# Patient Record
Sex: MALE | Race: WHITE | URBAN - METROPOLITAN AREA
[De-identification: names, ages, dates, MRNs, and addresses within clinical notes are randomized per-mention and may not be internally consistent; named-entity substitution may affect disease eponyms.]

---

## 2017-04-21 ENCOUNTER — APPOINTMENT (OUTPATIENT)
Dept: GENERAL RADIOLOGY | Facility: CLINIC | Age: 57
End: 2017-04-21
Attending: EMERGENCY MEDICINE
Payer: COMMERCIAL

## 2017-04-21 ENCOUNTER — HOSPITAL ENCOUNTER (OUTPATIENT)
Facility: CLINIC | Age: 57
Setting detail: OBSERVATION
Discharge: HOME | End: 2017-04-22
Attending: EMERGENCY MEDICINE | Admitting: INTERNAL MEDICINE
Payer: COMMERCIAL

## 2017-04-21 DIAGNOSIS — J18.9 PNEUMONIA: ICD-10-CM

## 2017-04-21 DIAGNOSIS — J18.9 COMMUNITY ACQUIRED PNEUMONIA: Primary | ICD-10-CM

## 2017-04-21 LAB
ALBUMIN SERPL-MCNC: 4.1 G/DL (ref 3.4–5)
ALBUMIN UR-MCNC: NEGATIVE MG/DL
ALP SERPL-CCNC: 36 U/L (ref 40–150)
ALT SERPL W P-5'-P-CCNC: 35 U/L (ref 0–70)
ANION GAP SERPL CALCULATED.3IONS-SCNC: 11 MMOL/L (ref 3–14)
APPEARANCE UR: CLEAR
AST SERPL W P-5'-P-CCNC: 25 U/L (ref 0–45)
BASOPHILS # BLD AUTO: 0 10E9/L (ref 0–0.2)
BASOPHILS NFR BLD AUTO: 0.2 %
BILIRUB SERPL-MCNC: 0.8 MG/DL (ref 0.2–1.3)
BILIRUB UR QL STRIP: NEGATIVE
BUN SERPL-MCNC: 23 MG/DL (ref 7–30)
CALCIUM SERPL-MCNC: 9.1 MG/DL (ref 8.5–10.1)
CHLORIDE SERPL-SCNC: 101 MMOL/L (ref 94–109)
CO2 BLDCOV-SCNC: 27 MMOL/L (ref 21–28)
CO2 SERPL-SCNC: 25 MMOL/L (ref 20–32)
COLOR UR AUTO: YELLOW
CREAT SERPL-MCNC: 1.03 MG/DL (ref 0.66–1.25)
DIFFERENTIAL METHOD BLD: ABNORMAL
EOSINOPHIL # BLD AUTO: 0 10E9/L (ref 0–0.7)
EOSINOPHIL NFR BLD AUTO: 0.1 %
ERYTHROCYTE [DISTWIDTH] IN BLOOD BY AUTOMATED COUNT: 14.2 % (ref 10–15)
FLUAV+FLUBV AG SPEC QL: NEGATIVE
FLUAV+FLUBV AG SPEC QL: NORMAL
GFR SERPL CREATININE-BSD FRML MDRD: 75 ML/MIN/1.7M2
GLUCOSE SERPL-MCNC: 100 MG/DL (ref 70–99)
GLUCOSE UR STRIP-MCNC: NEGATIVE MG/DL
HCT VFR BLD AUTO: 45 % (ref 40–53)
HGB BLD-MCNC: 16.3 G/DL (ref 13.3–17.7)
HGB UR QL STRIP: NEGATIVE
HYALINE CASTS #/AREA URNS LPF: 1 /LPF (ref 0–2)
IMM GRANULOCYTES # BLD: 0.1 10E9/L (ref 0–0.4)
IMM GRANULOCYTES NFR BLD: 0.5 %
INTERPRETATION ECG - MUSE: NORMAL
KETONES UR STRIP-MCNC: 10 MG/DL
LACTATE BLD-SCNC: 1.5 MMOL/L (ref 0.7–2.1)
LEUKOCYTE ESTERASE UR QL STRIP: NEGATIVE
LYMPHOCYTES # BLD AUTO: 0.5 10E9/L (ref 0.8–5.3)
LYMPHOCYTES NFR BLD AUTO: 2.8 %
MCH RBC QN AUTO: 32.9 PG (ref 26.5–33)
MCHC RBC AUTO-ENTMCNC: 36.2 G/DL (ref 31.5–36.5)
MCV RBC AUTO: 91 FL (ref 78–100)
MONOCYTES # BLD AUTO: 1 10E9/L (ref 0–1.3)
MONOCYTES NFR BLD AUTO: 5.2 %
MUCOUS THREADS #/AREA URNS LPF: PRESENT /LPF
NEUTROPHILS # BLD AUTO: 17.9 10E9/L (ref 1.6–8.3)
NEUTROPHILS NFR BLD AUTO: 91.2 %
NITRATE UR QL: NEGATIVE
NRBC # BLD AUTO: 0 10*3/UL
NRBC BLD AUTO-RTO: 0 /100
PCO2 BLDV: 41 MM HG (ref 40–50)
PH BLDV: 7.43 PH (ref 7.32–7.43)
PH UR STRIP: 8 PH (ref 5–7)
PLATELET # BLD AUTO: 166 10E9/L (ref 150–450)
PO2 BLDV: 34 MM HG (ref 25–47)
POTASSIUM SERPL-SCNC: 4 MMOL/L (ref 3.4–5.3)
PROT SERPL-MCNC: 7 G/DL (ref 6.8–8.8)
RBC # BLD AUTO: 4.96 10E12/L (ref 4.4–5.9)
RBC #/AREA URNS AUTO: <1 /HPF (ref 0–2)
SAO2 % BLDV FROM PO2: 67 %
SODIUM SERPL-SCNC: 137 MMOL/L (ref 133–144)
SP GR UR STRIP: 1.01 (ref 1–1.03)
SPECIMEN SOURCE: NORMAL
TROPONIN I SERPL-MCNC: NORMAL UG/L (ref 0–0.04)
URN SPEC COLLECT METH UR: ABNORMAL
UROBILINOGEN UR STRIP-MCNC: NORMAL MG/DL (ref 0–2)
WBC # BLD AUTO: 19.6 10E9/L (ref 4–11)
WBC #/AREA URNS AUTO: 1 /HPF (ref 0–2)

## 2017-04-21 PROCEDURE — 87040 BLOOD CULTURE FOR BACTERIA: CPT | Performed by: EMERGENCY MEDICINE

## 2017-04-21 PROCEDURE — 36415 COLL VENOUS BLD VENIPUNCTURE: CPT

## 2017-04-21 PROCEDURE — 96375 TX/PRO/DX INJ NEW DRUG ADDON: CPT

## 2017-04-21 PROCEDURE — 87077 CULTURE AEROBIC IDENTIFY: CPT | Performed by: EMERGENCY MEDICINE

## 2017-04-21 PROCEDURE — 71020 XR CHEST 2 VW: CPT

## 2017-04-21 PROCEDURE — 87800 DETECT AGNT MULT DNA DIREC: CPT | Performed by: EMERGENCY MEDICINE

## 2017-04-21 PROCEDURE — 25000128 H RX IP 250 OP 636: Performed by: INTERNAL MEDICINE

## 2017-04-21 PROCEDURE — 93005 ELECTROCARDIOGRAM TRACING: CPT

## 2017-04-21 PROCEDURE — 36415 COLL VENOUS BLD VENIPUNCTURE: CPT | Performed by: EMERGENCY MEDICINE

## 2017-04-21 PROCEDURE — 87186 SC STD MICRODIL/AGAR DIL: CPT | Performed by: EMERGENCY MEDICINE

## 2017-04-21 PROCEDURE — 85025 COMPLETE CBC W/AUTO DIFF WBC: CPT | Performed by: EMERGENCY MEDICINE

## 2017-04-21 PROCEDURE — 99285 EMERGENCY DEPT VISIT HI MDM: CPT | Mod: 25

## 2017-04-21 PROCEDURE — 81001 URINALYSIS AUTO W/SCOPE: CPT | Performed by: EMERGENCY MEDICINE

## 2017-04-21 PROCEDURE — 87804 INFLUENZA ASSAY W/OPTIC: CPT | Performed by: EMERGENCY MEDICINE

## 2017-04-21 PROCEDURE — 25000128 H RX IP 250 OP 636: Performed by: EMERGENCY MEDICINE

## 2017-04-21 PROCEDURE — 82803 BLOOD GASES ANY COMBINATION: CPT

## 2017-04-21 PROCEDURE — 96365 THER/PROPH/DIAG IV INF INIT: CPT

## 2017-04-21 PROCEDURE — 84484 ASSAY OF TROPONIN QUANT: CPT | Performed by: EMERGENCY MEDICINE

## 2017-04-21 PROCEDURE — G0378 HOSPITAL OBSERVATION PER HR: HCPCS

## 2017-04-21 PROCEDURE — 83605 ASSAY OF LACTIC ACID: CPT

## 2017-04-21 PROCEDURE — 96361 HYDRATE IV INFUSION ADD-ON: CPT

## 2017-04-21 PROCEDURE — 96367 TX/PROPH/DG ADDL SEQ IV INF: CPT

## 2017-04-21 PROCEDURE — 99220 ZZC INITIAL OBSERVATION CARE,LEVL III: CPT | Performed by: INTERNAL MEDICINE

## 2017-04-21 PROCEDURE — 80053 COMPREHEN METABOLIC PANEL: CPT | Performed by: EMERGENCY MEDICINE

## 2017-04-21 RX ORDER — AMOXICILLIN 250 MG
1-2 CAPSULE ORAL 2 TIMES DAILY PRN
Status: DISCONTINUED | OUTPATIENT
Start: 2017-04-21 | End: 2017-04-22 | Stop reason: HOSPADM

## 2017-04-21 RX ORDER — ONDANSETRON 2 MG/ML
4 INJECTION INTRAMUSCULAR; INTRAVENOUS EVERY 6 HOURS PRN
Status: DISCONTINUED | OUTPATIENT
Start: 2017-04-21 | End: 2017-04-22 | Stop reason: HOSPADM

## 2017-04-21 RX ORDER — SODIUM CHLORIDE 9 MG/ML
INJECTION, SOLUTION INTRAVENOUS CONTINUOUS
Status: DISCONTINUED | OUTPATIENT
Start: 2017-04-21 | End: 2017-04-22 | Stop reason: HOSPADM

## 2017-04-21 RX ORDER — ONDANSETRON 2 MG/ML
4 INJECTION INTRAMUSCULAR; INTRAVENOUS ONCE
Status: COMPLETED | OUTPATIENT
Start: 2017-04-21 | End: 2017-04-21

## 2017-04-21 RX ORDER — CEFTRIAXONE 1 G/1
1 INJECTION, POWDER, FOR SOLUTION INTRAMUSCULAR; INTRAVENOUS EVERY 24 HOURS
Status: DISCONTINUED | OUTPATIENT
Start: 2017-04-22 | End: 2017-04-22 | Stop reason: HOSPADM

## 2017-04-21 RX ORDER — ONDANSETRON 4 MG/1
4 TABLET, ORALLY DISINTEGRATING ORAL EVERY 6 HOURS PRN
Status: DISCONTINUED | OUTPATIENT
Start: 2017-04-21 | End: 2017-04-22 | Stop reason: HOSPADM

## 2017-04-21 RX ORDER — SODIUM CHLORIDE 9 MG/ML
1000 INJECTION, SOLUTION INTRAVENOUS CONTINUOUS
Status: DISCONTINUED | OUTPATIENT
Start: 2017-04-21 | End: 2017-04-22 | Stop reason: HOSPADM

## 2017-04-21 RX ORDER — CEFTRIAXONE 2 G/1
2 INJECTION, POWDER, FOR SOLUTION INTRAMUSCULAR; INTRAVENOUS ONCE
Status: COMPLETED | OUTPATIENT
Start: 2017-04-21 | End: 2017-04-21

## 2017-04-21 RX ORDER — NALOXONE HYDROCHLORIDE 0.4 MG/ML
.1-.4 INJECTION, SOLUTION INTRAMUSCULAR; INTRAVENOUS; SUBCUTANEOUS
Status: DISCONTINUED | OUTPATIENT
Start: 2017-04-21 | End: 2017-04-22 | Stop reason: HOSPADM

## 2017-04-21 RX ORDER — LISINOPRIL AND HYDROCHLOROTHIAZIDE 20; 25 MG/1; MG/1
1 TABLET ORAL DAILY
COMMUNITY

## 2017-04-21 RX ORDER — ACETAMINOPHEN 325 MG/1
650 TABLET ORAL EVERY 4 HOURS PRN
Status: DISCONTINUED | OUTPATIENT
Start: 2017-04-21 | End: 2017-04-22 | Stop reason: HOSPADM

## 2017-04-21 RX ORDER — BISACODYL 10 MG
10 SUPPOSITORY, RECTAL RECTAL DAILY PRN
Status: DISCONTINUED | OUTPATIENT
Start: 2017-04-21 | End: 2017-04-22 | Stop reason: HOSPADM

## 2017-04-21 RX ORDER — AZITHROMYCIN 250 MG/1
250 TABLET, FILM COATED ORAL DAILY
Status: DISCONTINUED | OUTPATIENT
Start: 2017-04-22 | End: 2017-04-22 | Stop reason: HOSPADM

## 2017-04-21 RX ORDER — ZOLPIDEM TARTRATE 5 MG/1
5 TABLET ORAL
Status: DISCONTINUED | OUTPATIENT
Start: 2017-04-21 | End: 2017-04-22 | Stop reason: HOSPADM

## 2017-04-21 RX ADMIN — AZITHROMYCIN MONOHYDRATE 500 MG: 500 INJECTION, POWDER, LYOPHILIZED, FOR SOLUTION INTRAVENOUS at 17:23

## 2017-04-21 RX ADMIN — CEFTRIAXONE 2 G: 2 INJECTION, POWDER, FOR SOLUTION INTRAMUSCULAR; INTRAVENOUS at 16:45

## 2017-04-21 RX ADMIN — ONDANSETRON 4 MG: 2 SOLUTION INTRAMUSCULAR; INTRAVENOUS at 15:36

## 2017-04-21 RX ADMIN — SODIUM CHLORIDE: 9 INJECTION, SOLUTION INTRAVENOUS at 19:26

## 2017-04-21 RX ADMIN — SODIUM CHLORIDE 1000 ML: 9 INJECTION, SOLUTION INTRAVENOUS at 16:42

## 2017-04-21 RX ADMIN — SODIUM CHLORIDE 1000 ML: 9 INJECTION, SOLUTION INTRAVENOUS at 15:36

## 2017-04-21 ASSESSMENT — ENCOUNTER SYMPTOMS
HEMATURIA: 0
CHILLS: 1
FATIGUE: 1
SHORTNESS OF BREATH: 1
DYSURIA: 0
VOMITING: 0
COUGH: 0
RHINORRHEA: 0
DIFFICULTY URINATING: 0
FREQUENCY: 0
CHEST TIGHTNESS: 1
DIZZINESS: 1
DIARRHEA: 0
HEADACHES: 0
SINUS PRESSURE: 0

## 2017-04-21 NOTE — IP AVS SNAPSHOT
MRN:[id number]                      After Visit Summary   4/21/2017    Aidan Perrin    MRN: [id number]           Thank you!     Thank you for choosing [facility] for your care. Our goal is always to provide you with excellent care. Hearing back from our patients is one way we can continue to improve our services. Please take a few minutes to complete the written survey that you may receive in the mail after you visit with us. Thank you!        Patient Information     Date Of Birth          1960        About your hospital stay     You were admitted on:  April 21, 2017 [facility]    You were discharged on:  April 22, 2017        Reason for your hospital stay       You were admitted for evaluation of community acquired pneumonia. You were started on 2 antibiotics that will be continued as outpatient. Follow up with with PCP in 10-14 days. Seek urgent re-evaluation if you develop persistent fever or shortness of breath.                  Who to Call     For medical emergencies, please call 911.  For non-urgent questions about your medical care, please call your primary care provider or clinic, [phone]          Attending Provider     Provider Specialty    Elisha Vargas MD Emergency Medicine    Kiki, Sumeet Love MD Internal Medicine       Primary Care Provider Office Phone # Fax #    Sumeet Landaverde [phone]       [facility] [address] [place] [address]        After Care Instructions     Activity       Your activity upon discharge: activity as tolerated            Diet       Follow this diet upon discharge: Orders Placed This Encounter      Regular Diet Adult                  Follow-up Appointments     Follow-up and recommended labs and tests        Follow up with primary care provider, Sumeet Landaverde, within 10-14 days for hospital follow- up.  The following labs/tests are recommended:  "Repeat CXR.                  Pending Results     Date and Time Order Name Status Description    2017 1612 Blood culture Preliminary     2017 1549 Blood culture Preliminary             Statement of Approval     Ordered          17 1003  I have reviewed and agree with all the recommendations and orders detailed in this document.  EFFECTIVE NOW     Approved and electronically signed by:  Shiloh Riggins PA-C             Admission Information     Date & Time Provider Department Dept. Phone    2017 Sumeet Swanson MD [facility] [phone]      Your Vitals Were     Blood Pressure Pulse Temperature Respirations Height Weight    128/75 (BP Location: Right arm) 131 98.6  F (37  C) (Oral) 16 1.88 m (6' 2\") 97.5 kg (215 lb)    Pulse Oximetry BMI (Body Mass Index)                99% 27.6 kg/m2          MyChart Information     [facility] lets you send messages to your doctor, view your test results, renew your prescriptions, schedule appointments and more. To sign up, go to [email or web address] . Click on \"Log in\" on the left side of the screen, which will take you to the Welcome page. Then click on \"Sign up Now\" on the right side of the page.     You will be asked to enter the access code listed below, as well as some personal information. Please follow the directions to create your username and password.     Your access code is: [email or web address]  Expires: 2017  4:32 PM     Your access code will  in 90 days. If you need help or a new code, please call your [facility] clinic or [phone].        Care EveryWhere ID     This is your Care EveryWhere ID. This could be used by other organizations to access your [facility] medical records  [id number]           Review of your medicines      START taking        Dose / Directions    azithromycin 250 MG tablet   Commonly known as:  ZITHROMAX   Indication:  Community Acquired Pneumonia        Dose:  250 mg   Start taking on:  " 4/23/2017   Take 1 tablet (250 mg) by mouth daily   Quantity:  3 tablet   Refills:  0       cefuroxime 500 MG tablet   Commonly known as:  CEFTIN        Dose:  500 mg   Take 1 tablet (500 mg) by mouth 2 times daily for 7 days   Quantity:  14 tablet   Refills:  0         CONTINUE these medicines which have NOT CHANGED        Dose / Directions    CENTRUM ADULTS PO        Dose:  1 tablet   Take 1 tablet by mouth daily   Refills:  0       lisinopril-hydrochlorothiazide 20-25 MG per tablet   Commonly known as:  PRINZIDE/ZESTORETIC        Dose:  1 tablet   Take 1 tablet by mouth daily   Refills:  0       METOPROLOL SUCCINATE ER PO        Dose:  50 mg   Take 50 mg by mouth every evening   Refills:  0            Where to get your medicines      These medications were sent to [facility] - [address]  [address], [place] [address]     Phone:  [phone]     azithromycin 250 MG tablet    cefuroxime 500 MG tablet                Protect others around you: Learn how to safely use, store and throw away your medicines at www.disposemymeds.org.             Medication List: This is a list of all your medications and when to take them. Check marks below indicate your daily home schedule. Keep this list as a reference.      Medications           Morning Afternoon Evening Bedtime As Needed    azithromycin 250 MG tablet   Commonly known as:  ZITHROMAX   Take 1 tablet (250 mg) by mouth daily   Start taking on:  4/23/2017   Last time this was given:  250 mg on 4/22/2017  8:07 AM                                cefuroxime 500 MG tablet   Commonly known as:  CEFTIN   Take 1 tablet (500 mg) by mouth 2 times daily for 7 days                                CENTRUM ADULTS PO   Take 1 tablet by mouth daily                                lisinopril-hydrochlorothiazide 20-25 MG per tablet   Commonly known as:  PRINZIDE/ZESTORETIC   Take 1 tablet by mouth daily                                 METOPROLOL SUCCINATE ER PO   Take 50 mg by mouth every evening

## 2017-04-21 NOTE — ED NOTES
RN attempted to administer IV Rochepin but second set of blood cultures has not been collected by lab. RN called lab approx 30 mins ago. Rn called lab again and lab personal is on the way

## 2017-04-21 NOTE — IP AVS SNAPSHOT
[facility]    [address] [place] [address]    Phone:  [phone]                                       After Visit Summary   4/21/2017    Adian Perrin    MRN: [id number]           After Visit Summary Signature Page     I have received my discharge instructions, and my questions have been answered. I have discussed any challenges I see with this plan with the nurse or doctor.    ..........................................................................................................................................  Patient/Patient Representative Signature      ..........................................................................................................................................  Patient Representative Print Name and Relationship to Patient    ..................................................               ................................................  Date                                            Time    ..........................................................................................................................................  Reviewed by Signature/Title    ...................................................              ..............................................  Date                                                            Time

## 2017-04-21 NOTE — ED NOTES
"[facility]  ED Nurse Handoff Report    ED Chief complaint: Chills (at 0900 pt had chills developed SOB with dizziness called PMD nurse told to come to ER)      ED Diagnosis:   Final diagnoses:   None       Code Status: Full Code    Allergies:   Allergies   Allergen Reactions     Penicillins        Activity level - Baseline/Home:  Independent    Activity Level - Current:   Independent     Needed?: No    Isolation: No  Infection: Not Applicable    Bariatric?: No    Vital Signs:   Vitals:    04/21/17 1632 04/21/17 1645 04/21/17 1658 04/21/17 1700   BP:    116/83   Pulse:       Resp: 15  12    Temp:       TempSrc:       SpO2: 100% 99% 99% 100%   Weight:       Height:           Cardiac Rhythm: ,        Pain level: 0-10 Pain Scale: 1    Is this patient confused?: No    Patient Report: Initial Complaint: Chills, dizziness, SOB  Focused Assessment: Pt presents from home when this morning at 0900 he developed chills, SOB, dizziness and \"chest compression.\" Pt states his chest feels tight in the epigastric area and pain is worsened by taking a deep breath. Pt states he was febrile at home and took ibuprofen to treat his fever. Pt denied any other sx besides his chills/sob/chest discomfort. Pt WBC 19, lactic normal and so blood cultures x2 were sent. Blood work otherwise unremarkable. UA clean and pt went for CXR which showed a L PNA. Pt given 2 liters IVF and IV rochephin. Rn to hang azithromycin at this time. Pt initially ST but now HR ranging from  bpm, blood pressure 105-115 systolic and 100% RA. Wife at bedside   Tests Performed: CXR, troponin (negative), blood work, lactic (1.5), UA  Abnormal Results: WBC 19, cxr showed L lung PNA  Treatments provided:  2 liters NS, IV antibiotics    Family Comments: Wife at bedside    OBS brochure/video discussed/provided to patient: Yes    ED Medications:   Medications   0.9% sodium chloride BOLUS (0 mLs Intravenous Stopped 4/21/17 1642)     Followed " by   0.9% sodium chloride BOLUS (1,000 mLs Intravenous New Bag 4/21/17 1642)     Followed by   0.9% sodium chloride infusion (not administered)   cefTRIAXone (ROCEPHIN) 2 g vial to attach to  ml bag for ADULTS or NS 50 ml bag for PEDS (2 g Intravenous New Bag 4/21/17 1645)   azithromycin (ZITHROMAX) 500 mg in NaCl 0.9 % 250 mL intermittent infusion (not administered)   ondansetron (ZOFRAN) injection 4 mg (4 mg Intravenous Given 4/21/17 1536)       Drips infusing?:  Yes        ED NURSE PHONE NUMBER: *[id number]

## 2017-04-21 NOTE — ED PROVIDER NOTES
"  History     Chief Complaint:  Chills    HPI   Aidan Perrin is a 56 year old male who presents to the emergency department today for evaluation of chills. The patient reports that he felt fine last night but then this morning at 0900 he developed severe  chills, shortness of breath, dizziness, an elevated temperature, and a \"weird feeling in his arms.\" The patient reports that he then felt sleepy, fatigued, and he just wanted to lie down. This did not relieve his symptoms so he called his primary care physician who told the patient to come here to the emergency department. The patient denies any headache, nose pain, ear pain, coughing, vomiting, diarrhea, or urinary symptoms. Currently, the patient explains that he feels a chest pressure and this is making it difficult for him to breath. He reports that he has only eaten a few bites of cold pizza today. The patient took Ibuprofen this morning at home.     Cardiac/PE/DVT Risk Factors:  History of hypertension - Positive  History of hyperlipidemia - Negative   History of diabetes - Negative   History of smoking - Negative   Personal history of PE/DVT - Negative   Family history of PE/DVT - Negative   Recent travel - Negative   Recent surgery - Negative   Other immobilizations - Negative   Cancer - Negative     Allergies:  Penicillins     Medications:    Metoprolol  Lisinopril     Past Medical History:    Hypertension     Past Surgical History:    History reviewed. No pertinent surgical history.    Family History:    No family history on file.    Social History:  The patient was accompanied to the ED by his wife.  Smoking Status: Never Smoker  Alcohol Use: Positive  Marital Status:   [2]  Employment Status: Works at a [employer] company      Review of Systems   Constitutional: Positive for chills and fatigue.   HENT: Negative for congestion, ear pain, nosebleeds, rhinorrhea and sinus pressure.    Respiratory: Positive for chest tightness (\"Pressure\") and " "shortness of breath. Negative for cough.    Gastrointestinal: Negative for diarrhea and vomiting.   Genitourinary: Negative for decreased urine volume, difficulty urinating, dysuria, frequency, hematuria and urgency.   Neurological: Positive for dizziness. Negative for headaches.   All other systems reviewed and are negative.    Physical Exam   Vitals:  Patient Vitals for the past 24 hrs:   BP Temp Temp src Pulse Heart Rate Resp SpO2 Height Weight   04/21/17 1819 126/79 97.5  F (36.4  C) Oral - 107 16 98 % - -   04/21/17 1800 106/70 - - - 97 - 100 % - -   04/21/17 1745 - - - - 95 - 100 % - -   04/21/17 1730 109/76 - - - 100 - 99 % - -   04/21/17 1700 116/83 - - - 93 - 100 % - -   04/21/17 1658 - - - - - 12 99 % - -   04/21/17 1645 - - - - - - 99 % - -   04/21/17 1632 - - - - 99 15 100 % - -   04/21/17 1630 104/69 - - - 97 - 100 % - -   04/21/17 1615 - - - - 105 12 100 % - -   04/21/17 1610 101/69 - - - 102 13 - - -   04/21/17 1538 - - - - 101 - 100 % - -   04/21/17 1535 116/81 - - - 105 - - - -   04/21/17 1515 111/80 - - - - - - - -   04/21/17 1500 - - - - - - 100 % - -   04/21/17 1449 122/84 97.8  F (36.6  C) Oral 131 - 20 100 % 1.88 m (6' 2\") 97.5 kg (215 lb)   04/21/17 1445 122/84 - - - - - - - -      Physical Exam  Constitutional:  Oriented to person, place, and time.      Appears well-developed and well-nourished. Appears non toxic. Feels warm.   HENT:   Head:    Normocephalic and atraumatic.   Right Ear:   Tympanic membrane and external ear normal.   Left Ear:   Tympanic membrane and external ear normal.   Mouth/Throat:   Oropharynx is clear and moist.      Mucous membranes are normal.   Eyes:    Conjunctivae normal and EOM are normal.      Pupils are equal, round, and reactive to light.   Neck:    Normal range of motion. Neck supple.   Cardiovascular:  Tachycardic rate, regular rhythm, S1 normal and S2 normal.      No gallop and no friction rub. No murmur heard.  Pulmonary/Chest:  Breath sounds normal. No " respiratory distress.      No wheezes. No rhonchi. No rales.   Abdominal:   Soft. No hepatosplenomegaly. No tenderness.      No rebound and no CVA tenderness.   Musculoskeletal:  Normal range of motion.   Neurological:   Alert and oriented to person, place, and time. Normal strength.      GCS eye subscore is 4. GCS verbal subscore is 5.      GCS motor subscore is 6.   Skin:    Skin is warm and dry.   Psychiatric:   Normal mood and affect.      Speech is normal and behavior is normal.      Judgment and thought content normal.      Cognition and memory are normal.     Emergency Department Course     ECG:  ECG taken at 1511, ECG read at 1520  Sinus tachycardia   Inferior infarct, age undetermined  Abnormal ECG  Rate 124 bpm. VA interval 152 ms. QRS duration 72 ms. QT/QTc 300/431 ms. P-R-T axes 58 -15 42.    Imaging:  Radiology findings were communicated with the patient who voiced understanding of the findings.    XR Chest 2 Views  Left basilar atelectasis versus pneumonia.  LORENA CARRERO MD  Reading per radiology    Laboratory:  Laboratory findings were communicated with the patient who voiced understanding of the findings.    Blood Culture: Pending   UA: Ketones: 10 (A), pH: 8.0 (H), Mucous: Present (A)  ISTAT gases lactate ana POCT: pH: 7.43, PCO2: 41, PO2: 34, Bicarbonate: 27, O2 Sat: 67, Lactic Acid: 1.5   CBC: WBC 19.6 (H), HGB 16.3,   CMP: Glucose 100 (H), Alkaline Phosphatase: 36 (L) o/w WNL (Creatinine 1.03)  Troponin I (Collected 1529): <0.015  Blood Culture: Pending       Interventions:  1536 NS 1000 ml IV   1536 Zofran 4 mg IV  1642 NS 1000 ml IV   1645 Rocephin 2 gm IV  1723 Azithromycin 500 mg IV     Emergency Department Course:  Nursing notes and vitals reviewed.  I performed an exam of the patient as documented above.   IV was inserted and blood was drawn for laboratory testing, results above.  The patient provided a urine sample here in the emergency department. This was sent for laboratory  testing, findings above.  The patient was sent for a XR Chest 2 Views while in the emergency department, results above.   1722 I spoke with Dr. Swanson of the hospitalist service who will admit the patient.  I discussed the treatment plan with the patient. They expressed understanding of this plan and consented to admission. I discussed the patient with Dr. Swanson, who will admit the patient to a monitored bed for further evaluation and treatment.  I personally reviewed the laboratory and imaging results with the patient and answered all related questions prior to admission.  Impression & Plan      Medical Decision Making:  Aidan Perrin is a 56 year old male with a history of hypertension who presents feeling acutely ill today with rigors, shortness of breath, dizziness, and chest pressure. He denies any localizing systemic symptoms. He has a normal examination. Here his EKG shows tachycardia and he has an elevated white count of 19.6 and a chest x-ray that shows left basilar atelectasis versus pneumonia. His blood pressures have been stable. Here he has appeared non toxic. He has received normal saline bolus, Rocephin 2 gm IV, and Azithromycin IV. He is still not feeling quite right and will be kept overnight for observation on telemetry.     I suspect that the source of his infection is the pneumonia. He does have the chest pressure but I believe that this is more related to infection than primary cardiac, but certainly he can be observed for that assessment.    Assessment:   1. Sepsis secondary to pneumonia  2. Chest pressure, suspect secondary to 1.     Diagnosis:    ICD-10-CM    1. Pneumonia J18.9 Blood culture     Blood culture     Influenza A/B antigen     Disposition:   The patient is admitted into the care of Dr. Swanson to an observation telemetry bed.     Scribe Disclosure:  JHONATAN, Jordan Lares, am serving as a scribe at 3:02 PM on 4/21/2017 to document services personally performed by Elisha Vargas MD,  based on my observations and the provider's statements to me.     EMERGENCY DEPARTMENT       Elisha Vargas MD  04/21/17 9427

## 2017-04-21 NOTE — PHARMACY-ADMISSION MEDICATION HISTORY
Admission medication history interview status for the 4/21/2017  admission is complete. See EPIC admission navigator for prior to admission medications     Medication history source reliability:Good    Actions taken by pharmacist (provider contacted, etc):Verified Rx meds with patient's bottles that he brought to the hospital     Additional medication history information not noted on PTA med list :None    Medication reconciliation/reorder completed by provider prior to medication history? No    Time spent in this activity: 10 minutes    Prior to Admission medications    Medication Sig Last Dose Taking? Auth Provider   METOPROLOL SUCCINATE ER PO Take 50 mg by mouth every evening  4/20/2017 at pm Yes Reported, Patient   lisinopril-hydrochlorothiazide (PRINZIDE/ZESTORETIC) 20-25 MG per tablet Take 1 tablet by mouth daily 4/21/2017 at am Yes Unknown, Entered By History   Multiple Vitamins-Minerals (CENTRUM ADULTS PO) Take 1 tablet by mouth daily 4/21/2017 at am Yes Unknown, Entered By History

## 2017-04-22 VITALS
RESPIRATION RATE: 16 BRPM | HEART RATE: 131 BPM | HEIGHT: 74 IN | SYSTOLIC BLOOD PRESSURE: 128 MMHG | OXYGEN SATURATION: 99 % | WEIGHT: 215 LBS | DIASTOLIC BLOOD PRESSURE: 75 MMHG | TEMPERATURE: 98.6 F | BODY MASS INDEX: 27.59 KG/M2

## 2017-04-22 LAB
ANION GAP SERPL CALCULATED.3IONS-SCNC: 5 MMOL/L (ref 3–14)
BUN SERPL-MCNC: 15 MG/DL (ref 7–30)
CALCIUM SERPL-MCNC: 8.1 MG/DL (ref 8.5–10.1)
CHLORIDE SERPL-SCNC: 104 MMOL/L (ref 94–109)
CO2 SERPL-SCNC: 28 MMOL/L (ref 20–32)
CREAT SERPL-MCNC: 0.98 MG/DL (ref 0.66–1.25)
ERYTHROCYTE [DISTWIDTH] IN BLOOD BY AUTOMATED COUNT: 14.6 % (ref 10–15)
GFR SERPL CREATININE-BSD FRML MDRD: 79 ML/MIN/1.7M2
GLUCOSE SERPL-MCNC: 110 MG/DL (ref 70–99)
HCT VFR BLD AUTO: 41.7 % (ref 40–53)
HGB BLD-MCNC: 14.6 G/DL (ref 13.3–17.7)
LACTATE BLD-SCNC: 1 MMOL/L (ref 0.7–2.1)
MCH RBC QN AUTO: 32.2 PG (ref 26.5–33)
MCHC RBC AUTO-ENTMCNC: 35 G/DL (ref 31.5–36.5)
MCV RBC AUTO: 92 FL (ref 78–100)
PLATELET # BLD AUTO: 128 10E9/L (ref 150–450)
POTASSIUM SERPL-SCNC: 4.3 MMOL/L (ref 3.4–5.3)
RBC # BLD AUTO: 4.53 10E12/L (ref 4.4–5.9)
SODIUM SERPL-SCNC: 137 MMOL/L (ref 133–144)
WBC # BLD AUTO: 9.9 10E9/L (ref 4–11)

## 2017-04-22 PROCEDURE — 80048 BASIC METABOLIC PNL TOTAL CA: CPT | Performed by: INTERNAL MEDICINE

## 2017-04-22 PROCEDURE — 85027 COMPLETE CBC AUTOMATED: CPT | Performed by: INTERNAL MEDICINE

## 2017-04-22 PROCEDURE — 83605 ASSAY OF LACTIC ACID: CPT | Performed by: INTERNAL MEDICINE

## 2017-04-22 PROCEDURE — 25000132 ZZH RX MED GY IP 250 OP 250 PS 637: Performed by: INTERNAL MEDICINE

## 2017-04-22 PROCEDURE — 99217 ZZC OBSERVATION CARE DISCHARGE: CPT | Performed by: PHYSICIAN ASSISTANT

## 2017-04-22 PROCEDURE — 36415 COLL VENOUS BLD VENIPUNCTURE: CPT | Performed by: INTERNAL MEDICINE

## 2017-04-22 PROCEDURE — 25000128 H RX IP 250 OP 636: Performed by: INTERNAL MEDICINE

## 2017-04-22 PROCEDURE — G0378 HOSPITAL OBSERVATION PER HR: HCPCS

## 2017-04-22 RX ORDER — CEFUROXIME AXETIL 500 MG/1
500 TABLET ORAL 2 TIMES DAILY
Qty: 14 TABLET | Refills: 0 | Status: SHIPPED | OUTPATIENT
Start: 2017-04-22 | End: 2017-04-29

## 2017-04-22 RX ORDER — AZITHROMYCIN 250 MG/1
250 TABLET, FILM COATED ORAL DAILY
Qty: 3 TABLET | Refills: 0 | Status: SHIPPED | OUTPATIENT
Start: 2017-04-23

## 2017-04-22 RX ADMIN — ZOLPIDEM TARTRATE 5 MG: 5 TABLET, FILM COATED ORAL at 00:10

## 2017-04-22 RX ADMIN — SODIUM CHLORIDE: 9 INJECTION, SOLUTION INTRAVENOUS at 03:06

## 2017-04-22 RX ADMIN — ACETAMINOPHEN 650 MG: 325 TABLET, FILM COATED ORAL at 00:15

## 2017-04-22 RX ADMIN — AZITHROMYCIN 250 MG: 250 TABLET, FILM COATED ORAL at 08:07

## 2017-04-22 NOTE — H&P
DATE OF ADMISSION:  04/21/2017      PRIMARY CARE PHYSICIAN:  Sumeet Landaverde MD at [facility]      CHIEF COMPLAINT:  Chills, left-sided chest discomfort, shortness of breath.      HISTORY OF PRESENT ILLNESS:  Aidan Perrin is a fairly healthy 56-year-old  gentleman with history of hypertension for which he is on metoprolol and lisinopril.  He has no surgeries and no significant family history of any medical problems otherwise.  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  He felt fatigued and went to lie down and did and woke up and still was not feeling well and started having left-sided chest pressure.  He called his primary care physician's office.  They were not able to get him accommodated and his symptoms seemed to get worse.  The patient came to [facility] for further assessment.      In the emergency room, the patient was seen by Dr. Elisha Vargas.  The patient did not have a fever here, but he was tachycardic.  Blood pressures were stable.  Urinalysis was negative.  Blood cultures are obtained.  Influenza A&B were negative.  Blood gas venous showed pH of 7.43, pCO2 of 41, pO2 of 34.  Lactic acid 1.5.  Electrolytes were normal.  BUN 23, creatinine 1.00 with calculated GFR of 75.  Troponin is negative.  White count elevated at 19,600 with left shift with absolute neutrophil count 17,900, hemoglobin 16.3, platelets of 166.  A 2D chest x-ray showed left basilar atelectasis versus pneumonia.  The patient was treated with ceftriaxone and Zithromax.  He is not on any oxygen.  He is currently being admitted under observation status for community-acquired pneumonia.      PAST MEDICAL HISTORY:  Hypertension.      PAST SURGICAL HISTORY:  None.      SOCIAL HISTORY:  .  No tobacco.  Rare alcohol.  He is independent business owner.      ALLERGIES:  Penicillin, unknown reaction as he had it as a child.       FAMILY HISTORY:  Both parents are healthy.  Father 89.  Mother 88.  They live independently.      REVIEW OF SYSTEMS:  Ten-point review of systems reviewed.  The patient denies any black or bloody stools, nausea or vomiting.  Denies any syncope or presyncope.  Positive for left-sided chest pressure or chills.  Denies any lower extremity edema.  Denies any headache, denies any confusion or vision changes.  Denies any neck pain, denies any productive sputum and otherwise 10-point review of systems reviewed.      PHYSICAL EXAMINATION:   VITAL SIGNS:  Temperature 97.5, heart rate 107, respirations 16, blood pressure 126/79, sats 98% on room air.   GENERAL:  The patient is a medium-built 56-year-old  gentleman who is pleasant, cooperative, in no distress.   HEENT:  Pupils equal.  Sclerae are anicteric.  Mucous membranes are moist.   NECK:  Veins are distended.   LUNGS:  Diminished breath sounds at the left lung base.  He has some pleuritic pain when he takes a deep breath.   CARDIOVASCULAR:  S1, S2, borderline tachycardic.   ABDOMEN:  Soft, nontender, nondistended, normoactive bowel sounds.   EXTREMITIES:  No edema.   NEUROLOGIC:  Appears grossly nonfocal.   SKIN:  Warm, dry, well perfused, without any rashes.      LABORATORY DATA:  As dictated in History of Present Illness.      ASSESSMENT:  Aidan Perrin is a 56-year-old  gentleman with history of hypertension admitted with about an 8-hour episodes of chills, left-sided chest pressure with leukocytosis but not hypoxemia, findings of left basilar infiltrate being treated for community-acquired pneumonia.      PLAN:   1.  Community-acquired pneumonia, left lower lobe.  The patient will be admitted and treated with Zithromax and ceftriaxone.  If he is able to tolerate oral pills and if his pain is better, the patient could be discharged on Zithromax and Ceftin for 5-7 days.  I am a little bit concerned with respect to the amount of pain that he is  having.  The patient was advised on a combination of pneumonia including empyema and effusions in the event this is Strep pneumonia type of infection.  The patient will likely need followup if he were to be discharged tomorrow with a repeat chest x-ray.   2.  Hypertension.  Blood pressure appears to be stable.  He does not recall his metoprolol and lisinopril doses.  Will get pharmacy to help us with the doses and will continue him on that.  EKG appears to be okay.   3.  DVT prophylaxis.  Anticipate a brief stay so will hold off on any chemo or mechanical DVT prophylaxis.      CODE STATUS:  Full.         SUMEET ESTES MD             D: 2017 21:25   T: 2017 23:07   MT: mg      Name:     ELVIS LONDONO   MRN:      [id number]        Account:      [id number]   :      1960           Admitted:     [id number]      Document: [id number]       cc: Sumeet Landaverde MD

## 2017-04-22 NOTE — PLAN OF CARE
Problem: Discharge Planning  Goal: Discharge Planning (Adult, OB, Behavioral, Peds)  Outcome: Improving    Observation goals PRIOR TO DISCHARGE     Comments:   1. Resolution of fever: Met. Temp 97.5  2. Able to eat; Met. Ate regular meal. Courtesy meal  3. Able to take po mediations: Met

## 2017-04-22 NOTE — PLAN OF CARE
Problem: Discharge Planning  Goal: Discharge Planning (Adult, OB, Behavioral, Peds)  Outcome: Adequate for Discharge Date Met:  04/22/17  Pt. A&O. VSS. Denied further chest pain. Improvement in SOB, minimal. O2 stable on room air with no signs of respiratory distress.  Tolerating diet and oral antibiotic. Discharging home with wife with plan for follow up in 2 weeks with PCP and repeat chest xray. Pt. Is aware he needs to make follow up appointment. Reviewed discharge medications and when to seek medical attention.

## 2017-04-22 NOTE — PLAN OF CARE
Problem: Discharge Planning  Goal: Discharge Planning (Adult, OB, Behavioral, Peds)  Outcome: Improving  Pt is A+O x 4. VSS on RA. LS is clear, no SOB. Very occasional cough. Reported baseline chest pressure 2-3, no CP. Up independently. Tolerating diet. Voiding.

## 2017-04-22 NOTE — DISCHARGE SUMMARY
DATE OF ADMISSION:  04/21/2017      DATE OF DISCHARGE:  04/22/2017      DATE OF VISIT:  04/22/2017      PRIMARY CARE PHYSICIAN:  Sumeet Landaverde MD, at [facility]        DISCHARGE DIAGNOSIS:  Community-acquired pneumonia, left lower lobe.      DISCHARGE MEDICATIONS:   NEW MEDICATIONS TO BEGIN AT DISCHARGE:   1.  Azithromycin 250 mg tablet 1 tablet daily for an additional 3 days.   2.  Ceftin 500 mg tablet twice daily for 7 days.      PRIOR TO ADMISSION MEDICATIONS TO BE CONTINUED AT DISCHARGE:     1.  Centrum adults.   2.  Lisinopril/hydrochlorothiazide 20/25 mg tablet daily.   3.  Metoprolol succinate 50 mg every morning.      DISPOSITION:  The patient will be discharged home on 04/22/2017.      FOLLOWUP INSTRUCTIONS:  The patient to follow up with primary care provider in the next 10-14 days.  A repeat chest x-ray should be obtained to ensure resolution of pneumonia.      PERTINENT LABORATORIES:   1.  WBC trend 19.6, 9.9.   2.  Urinalysis is negative.   3.  Influenza swab was negative.   4.  Lactic acid trend x 1.5, 1.0.      PENDING LABORATORY TESTS:  Blood cultures are currently pending.      IMAGING:  Chest x-ray 04/21 with left basilar opacity.      PROCEDURES:  None.      CONSULTANTS:  None.      HISTORY OF PRESENT ILLNESS:  Aidan Perrin is a very pleasant 56-year-old male with a past medical history of hypertension who presented to [facility] on 04/21/2017 for evaluation of fevers and chills as well as shortness of breath.  Symptoms started rather abruptly the day of presentation.  On arrival, he was noted to be tachycardic and febrile.  Laboratory evaluation revealed significant leukocytosis with left shift and evidence of a left basilar pneumonia.  He was not hypoxic and thus was initiated on IV antibiotics and admitted for observation.  For additional details regarding HPI, please see H&P by Sumeet Swanson MD.      HOSPITAL COURSE:  Left lower lobe community-acquired pneumonia.  The patient  was admitted under observation.  He was initiated on treatment with Zithromax and ceftriaxone.  He was initially afebrile on arrival to the observation unit and received 1 dose of Tylenol; however, he remained afebrile for greater than 10 hours.  His leukocytosis improved significantly from 19.6 down to 9.  He remained off oxygen with stable oxygenation saturations.  He was alert and oriented and felt well with no significant deficits.  He was deemed appropriate for discharge on oral antibiotics on 2017.  He will continue to complete a course of azithromycin as well as a 7-day course of Ceftin.  Recommend followup with PCP within 2 weeks to repeat chest x-ray.      PHYSICAL EXAMINATION:   GENERAL:  Elvis Perrin is an extremely pleasant 56-year-old male who is lying in bed in no acute distress and appears stated age.   VITAL SIGNS:  Blood pressure is 128/75, temperature 98.6, heart rate 100, O2 sat 99% on room air.   HEENT:  Normocephalic, atraumatic.  Oropharynx:  Uvula is midline, posterior pharynx is clear.  Mucous membranes are moist.   NECK:  Supple, no adenopathy, no thyromegaly.   CARDIOVASCULAR:  Mildly tachycardic but regular, no murmurs appreciated.   PULMONARY:  Normal effort, no wheezing, crackles in left base.   ABDOMEN:  Nondistended, nontender.   EXTREMITIES:  Moves all 4 extremities.  Dorsalis pedis radial pulses are palpable bilaterally.   NEUROLOGIC:  Alert and oriented.  Cranial nerves II through XII grossly intact.      TOTAL DISCHARGE TIME:  Greater than 30 minutes.      This patient was discussed with Dr. Kenia Harris of the Hospitalist Service who independently interviewed and examined the patient.  She is agreeable with the plan.         KENIA HARRIS DO       As dictated by MARISOL DENNISON PA-C            D: 2017 10:55   T: 2017 11:23   MT:       Name:     ELVIS PERRIN   MRN:      [id number]        Account:        [id number]   :      1960            Admit Date:     [id number]                                  Discharge Date:       Document: [id number]       cc: Sumeet Landaverde MD

## 2017-04-22 NOTE — PLAN OF CARE
Problem: Discharge Planning  Goal: Discharge Planning (Adult, OB, Behavioral, Peds)  Outcome: No Change  Goals to be met before discharge  1. Resolution of fever-partially met. Temp was 110.0-tylenol given with decrease in temp  2. Able to eat-met  3. Able to take po mediations-met

## 2017-04-22 NOTE — PLAN OF CARE
Problem: Goal Outcome Summary  Goal: Goal Outcome Summary  Outcome: No Change  A&Ox4. VSS on RA ex pulse slightly tachy and temp 100.9-PRN tylenol given with decrease in temp. Up independently. Denies pain, SOB and dizziness. IV fluids infusing.

## 2017-04-23 ENCOUNTER — TELEPHONE (OUTPATIENT)
Dept: OTHER | Facility: CLINIC | Age: 57
End: 2017-04-23

## 2017-04-23 NOTE — TELEPHONE ENCOUNTER
Called patient with regards to 1/2 blood cultures from 4/21 positive for Staph. Patient states continuing to feel better. No fevers. No concerns. Discussed that we suspect results are contamination. Encouraged patient to seek urgent follow up if develops persistent fever or worsening symptoms. He is in agreement. Second blood culture remains negative to date.    Shiloh Riggins PA-C  Hospitalist Service  [phone]

## 2017-04-25 LAB
BACTERIA SPEC CULT: ABNORMAL
Lab: ABNORMAL
MICRO REPORT STATUS: ABNORMAL
MICROORGANISM SPEC CULT: ABNORMAL
SPECIMEN SOURCE: ABNORMAL

## 2017-04-27 LAB
BACTERIA SPEC CULT: NO GROWTH
Lab: NORMAL
MICRO REPORT STATUS: NORMAL
SPECIMEN SOURCE: NORMAL

## 2024-03-04 NOTE — PLAN OF CARE
Problem: Goal Outcome Summary  Goal: Goal Outcome Summary  Outcome: No Change  Goals to be met before discharge  1. Resolution of fever-partially met. Temp was 110.0-tylenol given with decrease in temp  2. Able to eat-met  3. Able to take po mediations-met       05-Mar-2024 11-Mar-2024 12-Mar-2024